# Patient Record
Sex: MALE | Race: WHITE | NOT HISPANIC OR LATINO | URBAN - METROPOLITAN AREA
[De-identification: names, ages, dates, MRNs, and addresses within clinical notes are randomized per-mention and may not be internally consistent; named-entity substitution may affect disease eponyms.]

---

## 2022-08-08 ENCOUNTER — INPATIENT (INPATIENT)
Facility: HOSPITAL | Age: 57
LOS: 7 days | Discharge: HOME | DRG: 321 | End: 2022-08-16
Attending: NEUROLOGICAL SURGERY | Admitting: NEUROLOGICAL SURGERY
Payer: OTHER MISCELLANEOUS

## 2022-08-08 VITALS
SYSTOLIC BLOOD PRESSURE: 143 MMHG | HEART RATE: 50 BPM | TEMPERATURE: 97 F | WEIGHT: 181 LBS | DIASTOLIC BLOOD PRESSURE: 92 MMHG | HEIGHT: 70 IN | OXYGEN SATURATION: 100 % | RESPIRATION RATE: 16 BRPM

## 2022-08-08 DIAGNOSIS — G97.41 ACCIDENTAL PUNCTURE OR LACERATION OF DURA DURING A PROCEDURE: ICD-10-CM
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
## 2022-08-08 DIAGNOSIS — M51.17 INTERVERTEBRAL DISC DISORDERS WITH RADICULOPATHY, LUMBOSACRAL REGION: ICD-10-CM

## 2022-08-08 DIAGNOSIS — I10 ESSENTIAL (PRIMARY) HYPERTENSION: ICD-10-CM

## 2022-08-08 DIAGNOSIS — Z79.899 OTHER LONG TERM (CURRENT) DRUG THERAPY: ICD-10-CM

## 2022-08-08 DIAGNOSIS — G44.89 OTHER HEADACHE SYNDROME: ICD-10-CM

## 2022-08-08 DIAGNOSIS — G96.09 OTHER SPINAL CEREBROSPINAL FLUID LEAK: ICD-10-CM

## 2022-08-08 DIAGNOSIS — Z53.31 LAPAROSCOPIC SURGICAL PROCEDURE CONVERTED TO OPEN PROCEDURE: ICD-10-CM

## 2022-08-08 DIAGNOSIS — M51.26 OTHER INTERVERTEBRAL DISC DISPLACEMENT, LUMBAR REGION: ICD-10-CM

## 2022-08-08 LAB
ABO RH CONFIRMATION: SIGNIFICANT CHANGE UP
GLUCOSE BLDC GLUCOMTR-MCNC: 106 MG/DL — HIGH (ref 70–99)
GLUCOSE BLDC GLUCOMTR-MCNC: 121 MG/DL — HIGH (ref 70–99)
GLUCOSE BLDC GLUCOMTR-MCNC: 158 MG/DL — HIGH (ref 70–99)

## 2022-08-08 PROCEDURE — 80053 COMPREHEN METABOLIC PANEL: CPT

## 2022-08-08 PROCEDURE — 82962 GLUCOSE BLOOD TEST: CPT

## 2022-08-08 PROCEDURE — 85027 COMPLETE CBC AUTOMATED: CPT

## 2022-08-08 PROCEDURE — C1889: CPT

## 2022-08-08 PROCEDURE — 85025 COMPLETE CBC W/AUTO DIFF WBC: CPT

## 2022-08-08 PROCEDURE — 36415 COLL VENOUS BLD VENIPUNCTURE: CPT

## 2022-08-08 PROCEDURE — 72148 MRI LUMBAR SPINE W/O DYE: CPT

## 2022-08-08 PROCEDURE — 88304 TISSUE EXAM BY PATHOLOGIST: CPT | Mod: 26

## 2022-08-08 PROCEDURE — 80048 BASIC METABOLIC PNL TOTAL CA: CPT

## 2022-08-08 PROCEDURE — 72131 CT LUMBAR SPINE W/O DYE: CPT

## 2022-08-08 RX ORDER — FENTANYL CITRATE 50 UG/ML
50 INJECTION INTRAVENOUS
Refills: 0 | Status: DISCONTINUED | OUTPATIENT
Start: 2022-08-08 | End: 2022-08-08

## 2022-08-08 RX ORDER — FAMOTIDINE 10 MG/ML
20 INJECTION INTRAVENOUS EVERY 12 HOURS
Refills: 0 | Status: DISCONTINUED | OUTPATIENT
Start: 2022-08-08 | End: 2022-08-16

## 2022-08-08 RX ORDER — ACETAMINOPHEN 500 MG
650 TABLET ORAL EVERY 6 HOURS
Refills: 0 | Status: DISCONTINUED | OUTPATIENT
Start: 2022-08-08 | End: 2022-08-16

## 2022-08-08 RX ORDER — SODIUM CHLORIDE 9 MG/ML
1000 INJECTION INTRAMUSCULAR; INTRAVENOUS; SUBCUTANEOUS
Refills: 0 | Status: DISCONTINUED | OUTPATIENT
Start: 2022-08-08 | End: 2022-08-08

## 2022-08-08 RX ORDER — FOLIC ACID 0.8 MG
1 TABLET ORAL DAILY
Refills: 0 | Status: DISCONTINUED | OUTPATIENT
Start: 2022-08-08 | End: 2022-08-16

## 2022-08-08 RX ORDER — HYDROMORPHONE HYDROCHLORIDE 2 MG/ML
0.5 INJECTION INTRAMUSCULAR; INTRAVENOUS; SUBCUTANEOUS EVERY 4 HOURS
Refills: 0 | Status: DISCONTINUED | OUTPATIENT
Start: 2022-08-08 | End: 2022-08-15

## 2022-08-08 RX ORDER — HYDROMORPHONE HYDROCHLORIDE 2 MG/ML
0.5 INJECTION INTRAMUSCULAR; INTRAVENOUS; SUBCUTANEOUS
Refills: 0 | Status: DISCONTINUED | OUTPATIENT
Start: 2022-08-08 | End: 2022-08-08

## 2022-08-08 RX ORDER — DIAZEPAM 5 MG
5 TABLET ORAL EVERY 8 HOURS
Refills: 0 | Status: DISCONTINUED | OUTPATIENT
Start: 2022-08-08 | End: 2022-08-15

## 2022-08-08 RX ORDER — SENNA PLUS 8.6 MG/1
2 TABLET ORAL AT BEDTIME
Refills: 0 | Status: DISCONTINUED | OUTPATIENT
Start: 2022-08-08 | End: 2022-08-16

## 2022-08-08 RX ORDER — ACETAMINOPHEN 500 MG
1000 TABLET ORAL ONCE
Refills: 0 | Status: COMPLETED | OUTPATIENT
Start: 2022-08-08 | End: 2022-08-08

## 2022-08-08 RX ORDER — OXYCODONE HYDROCHLORIDE 5 MG/1
5 TABLET ORAL ONCE
Refills: 0 | Status: DISCONTINUED | OUTPATIENT
Start: 2022-08-08 | End: 2022-08-08

## 2022-08-08 RX ORDER — ONDANSETRON 8 MG/1
4 TABLET, FILM COATED ORAL ONCE
Refills: 0 | Status: DISCONTINUED | OUTPATIENT
Start: 2022-08-08 | End: 2022-08-08

## 2022-08-08 RX ORDER — ONDANSETRON 8 MG/1
4 TABLET, FILM COATED ORAL EVERY 6 HOURS
Refills: 0 | Status: DISCONTINUED | OUTPATIENT
Start: 2022-08-08 | End: 2022-08-16

## 2022-08-08 RX ORDER — CEFAZOLIN SODIUM 1 G
2000 VIAL (EA) INJECTION EVERY 8 HOURS
Refills: 0 | Status: COMPLETED | OUTPATIENT
Start: 2022-08-08 | End: 2022-08-08

## 2022-08-08 RX ORDER — OXYCODONE HYDROCHLORIDE 5 MG/1
10 TABLET ORAL EVERY 6 HOURS
Refills: 0 | Status: DISCONTINUED | OUTPATIENT
Start: 2022-08-08 | End: 2022-08-13

## 2022-08-08 RX ORDER — SODIUM CHLORIDE 9 MG/ML
1000 INJECTION, SOLUTION INTRAVENOUS
Refills: 0 | Status: DISCONTINUED | OUTPATIENT
Start: 2022-08-08 | End: 2022-08-08

## 2022-08-08 RX ADMIN — OXYCODONE HYDROCHLORIDE 5 MILLIGRAM(S): 5 TABLET ORAL at 11:45

## 2022-08-08 RX ADMIN — Medication 100 MILLIGRAM(S): at 15:30

## 2022-08-08 RX ADMIN — Medication 1000 MILLIGRAM(S): at 12:15

## 2022-08-08 RX ADMIN — SODIUM CHLORIDE 125 MILLILITER(S): 9 INJECTION, SOLUTION INTRAVENOUS at 11:45

## 2022-08-08 RX ADMIN — OXYCODONE HYDROCHLORIDE 5 MILLIGRAM(S): 5 TABLET ORAL at 12:50

## 2022-08-08 RX ADMIN — Medication 400 MILLIGRAM(S): at 11:44

## 2022-08-08 RX ADMIN — SODIUM CHLORIDE 75 MILLILITER(S): 9 INJECTION INTRAMUSCULAR; INTRAVENOUS; SUBCUTANEOUS at 15:30

## 2022-08-08 RX ADMIN — Medication 100 MILLIGRAM(S): at 23:14

## 2022-08-08 RX ADMIN — OXYCODONE HYDROCHLORIDE 10 MILLIGRAM(S): 5 TABLET ORAL at 20:22

## 2022-08-08 RX ADMIN — SENNA PLUS 2 TABLET(S): 8.6 TABLET ORAL at 21:13

## 2022-08-08 RX ADMIN — OXYCODONE HYDROCHLORIDE 10 MILLIGRAM(S): 5 TABLET ORAL at 20:50

## 2022-08-08 NOTE — ASU PREOP CHECKLIST - MUPIRONCIN COMMENTS
nash used CHG sponges x2,  body BRYANNA in ASU,  BRYANNA wipe to spine x2 by RN in ASU. Povidone nasally x2 to each nostril

## 2022-08-08 NOTE — ASU PATIENT PROFILE, ADULT - FALL HARM RISK - UNIVERSAL INTERVENTIONS
Bed in lowest position, wheels locked, appropriate side rails in place/Call bell, personal items and telephone in reach/Instruct patient to call for assistance before getting out of bed or chair/Non-slip footwear when patient is out of bed/[place] to call system/Physically safe environment - no spills, clutter or unnecessary equipment/Purposeful Proactive Rounding/Room/bathroom lighting operational, light cord in reach

## 2022-08-09 LAB
ANION GAP SERPL CALC-SCNC: 7 MMOL/L — SIGNIFICANT CHANGE UP (ref 5–17)
BASOPHILS # BLD AUTO: 0.02 K/UL — SIGNIFICANT CHANGE UP (ref 0–0.2)
BASOPHILS NFR BLD AUTO: 0.2 % — SIGNIFICANT CHANGE UP (ref 0–2)
BUN SERPL-MCNC: 17 MG/DL — SIGNIFICANT CHANGE UP (ref 7–23)
CALCIUM SERPL-MCNC: 8.5 MG/DL — SIGNIFICANT CHANGE UP (ref 8.5–10.1)
CHLORIDE SERPL-SCNC: 107 MMOL/L — SIGNIFICANT CHANGE UP (ref 96–108)
CO2 SERPL-SCNC: 26 MMOL/L — SIGNIFICANT CHANGE UP (ref 22–31)
CREAT SERPL-MCNC: 1.21 MG/DL — SIGNIFICANT CHANGE UP (ref 0.5–1.3)
EGFR: 71 ML/MIN/1.73M2 — SIGNIFICANT CHANGE UP
EOSINOPHIL # BLD AUTO: 0 K/UL — SIGNIFICANT CHANGE UP (ref 0–0.5)
EOSINOPHIL NFR BLD AUTO: 0 % — SIGNIFICANT CHANGE UP (ref 0–6)
GLUCOSE SERPL-MCNC: 108 MG/DL — HIGH (ref 70–99)
HCT VFR BLD CALC: 41 % — SIGNIFICANT CHANGE UP (ref 39–50)
HGB BLD-MCNC: 14.7 G/DL — SIGNIFICANT CHANGE UP (ref 13–17)
IMM GRANULOCYTES NFR BLD AUTO: 0.3 % — SIGNIFICANT CHANGE UP (ref 0–1.5)
LYMPHOCYTES # BLD AUTO: 0.87 K/UL — LOW (ref 1–3.3)
LYMPHOCYTES # BLD AUTO: 9.4 % — LOW (ref 13–44)
MCHC RBC-ENTMCNC: 33.9 PG — SIGNIFICANT CHANGE UP (ref 27–34)
MCHC RBC-ENTMCNC: 35.9 GM/DL — SIGNIFICANT CHANGE UP (ref 32–36)
MCV RBC AUTO: 94.7 FL — SIGNIFICANT CHANGE UP (ref 80–100)
MONOCYTES # BLD AUTO: 0.79 K/UL — SIGNIFICANT CHANGE UP (ref 0–0.9)
MONOCYTES NFR BLD AUTO: 8.5 % — SIGNIFICANT CHANGE UP (ref 2–14)
NEUTROPHILS # BLD AUTO: 7.53 K/UL — HIGH (ref 1.8–7.4)
NEUTROPHILS NFR BLD AUTO: 81.6 % — HIGH (ref 43–77)
PLATELET # BLD AUTO: 169 K/UL — SIGNIFICANT CHANGE UP (ref 150–400)
POTASSIUM SERPL-MCNC: 3.7 MMOL/L — SIGNIFICANT CHANGE UP (ref 3.5–5.3)
POTASSIUM SERPL-SCNC: 3.7 MMOL/L — SIGNIFICANT CHANGE UP (ref 3.5–5.3)
RBC # BLD: 4.33 M/UL — SIGNIFICANT CHANGE UP (ref 4.2–5.8)
RBC # FLD: 12.2 % — SIGNIFICANT CHANGE UP (ref 10.3–14.5)
SODIUM SERPL-SCNC: 140 MMOL/L — SIGNIFICANT CHANGE UP (ref 135–145)
WBC # BLD: 9.24 K/UL — SIGNIFICANT CHANGE UP (ref 3.8–10.5)
WBC # FLD AUTO: 9.24 K/UL — SIGNIFICANT CHANGE UP (ref 3.8–10.5)

## 2022-08-09 PROCEDURE — 99024 POSTOP FOLLOW-UP VISIT: CPT

## 2022-08-09 RX ORDER — METOPROLOL TARTRATE 50 MG
1 TABLET ORAL
Qty: 0 | Refills: 0 | DISCHARGE

## 2022-08-09 RX ORDER — ACETAMINOPHEN 500 MG
1000 TABLET ORAL ONCE
Refills: 0 | Status: COMPLETED | OUTPATIENT
Start: 2022-08-09 | End: 2022-08-09

## 2022-08-09 RX ORDER — LISINOPRIL 2.5 MG/1
1 TABLET ORAL
Qty: 0 | Refills: 0 | DISCHARGE

## 2022-08-09 RX ADMIN — HYDROMORPHONE HYDROCHLORIDE 0.5 MILLIGRAM(S): 2 INJECTION INTRAMUSCULAR; INTRAVENOUS; SUBCUTANEOUS at 07:22

## 2022-08-09 RX ADMIN — Medication 650 MILLIGRAM(S): at 19:54

## 2022-08-09 RX ADMIN — Medication 1 TABLET(S): at 08:50

## 2022-08-09 RX ADMIN — Medication 650 MILLIGRAM(S): at 21:03

## 2022-08-09 RX ADMIN — Medication 1 MILLIGRAM(S): at 08:50

## 2022-08-09 RX ADMIN — OXYCODONE HYDROCHLORIDE 10 MILLIGRAM(S): 5 TABLET ORAL at 09:20

## 2022-08-09 RX ADMIN — Medication 400 MILLIGRAM(S): at 16:15

## 2022-08-09 RX ADMIN — OXYCODONE HYDROCHLORIDE 10 MILLIGRAM(S): 5 TABLET ORAL at 08:50

## 2022-08-09 RX ADMIN — HYDROMORPHONE HYDROCHLORIDE 0.5 MILLIGRAM(S): 2 INJECTION INTRAMUSCULAR; INTRAVENOUS; SUBCUTANEOUS at 06:14

## 2022-08-09 NOTE — PROGRESS NOTE ADULT - ASSESSMENT
56 yo male POD#1 L5/S1 microdiscectomy and repair of CSF leak.   - cont pain meds, advance diet as tolerated   - cont hob flat    seq teds for dvt ppx   - encourage incentive blas   - will d/w Dr. Neff

## 2022-08-09 NOTE — PATIENT PROFILE ADULT - FUNCTIONAL ASSESSMENT - DAILY ACTIVITY 3.
----- Message from [place]incito Lagos sent at 12/30/2019  4:44 PM CST -----  Contact: Patient  Type: Needs Medical Advice    Who Called:  Patient  Best Call Back Number: [phone]  Additional Information: Patient would like to discuss receiving a release. Please call to advise. Thanks!  
LM for pt to return call   
2 = A lot of assistance

## 2022-08-09 NOTE — PATIENT PROFILE ADULT - FALL HARM RISK - HARM RISK INTERVENTIONS

## 2022-08-10 RX ORDER — METHOCARBAMOL 500 MG/1
1 TABLET, FILM COATED ORAL
Qty: 21 | Refills: 0
Start: 2022-08-10 | End: 2022-08-16

## 2022-08-10 RX ORDER — FOLIC ACID 0.8 MG
1 TABLET ORAL DAILY
Refills: 0 | Status: DISCONTINUED | OUTPATIENT
Start: 2022-08-10 | End: 2022-08-16

## 2022-08-10 RX ORDER — FOLIC ACID 0.8 MG
1 TABLET ORAL
Qty: 0 | Refills: 0 | DISCHARGE
Start: 2022-08-10

## 2022-08-10 RX ORDER — METOPROLOL TARTRATE 50 MG
50 TABLET ORAL
Refills: 0 | Status: DISCONTINUED | OUTPATIENT
Start: 2022-08-10 | End: 2022-08-16

## 2022-08-10 RX ORDER — LISINOPRIL 2.5 MG/1
20 TABLET ORAL DAILY
Refills: 0 | Status: DISCONTINUED | OUTPATIENT
Start: 2022-08-10 | End: 2022-08-16

## 2022-08-10 RX ORDER — DEXAMETHASONE 0.5 MG/5ML
2 ELIXIR ORAL EVERY 8 HOURS
Refills: 0 | Status: COMPLETED | OUTPATIENT
Start: 2022-08-10 | End: 2022-08-12

## 2022-08-10 RX ADMIN — LISINOPRIL 20 MILLIGRAM(S): 2.5 TABLET ORAL at 17:18

## 2022-08-10 RX ADMIN — OXYCODONE HYDROCHLORIDE 10 MILLIGRAM(S): 5 TABLET ORAL at 01:20

## 2022-08-10 RX ADMIN — Medication 5 MILLIGRAM(S): at 14:20

## 2022-08-10 RX ADMIN — OXYCODONE HYDROCHLORIDE 10 MILLIGRAM(S): 5 TABLET ORAL at 00:31

## 2022-08-10 RX ADMIN — Medication 2 MILLIGRAM(S): at 21:46

## 2022-08-10 RX ADMIN — Medication 1 TABLET(S): at 09:16

## 2022-08-10 RX ADMIN — Medication 50 MILLIGRAM(S): at 21:45

## 2022-08-10 RX ADMIN — SENNA PLUS 2 TABLET(S): 8.6 TABLET ORAL at 21:45

## 2022-08-10 RX ADMIN — Medication 1 MILLIGRAM(S): at 09:16

## 2022-08-10 NOTE — PROGRESS NOTE ADULT - ASSESSMENT
58 yo male POD#2 L5/S1 microdiscectomy and repair of CSF leak.   - cont pain meds, advance diet as tolerated   - slowly elevate HOB  - seq teds for dvt ppx   - encourage incentive blas   - if continued improvement possible d/c later today  - will d/w Dr. Neff

## 2022-08-10 NOTE — DISCHARGE NOTE PROVIDER - CARE PROVIDER_API CALL
Moshe Camejo)  Neurosurgery  [address] [place] [address]  Phone: [phone]  Fax: [phone]  Follow Up Time: 2 weeks

## 2022-08-10 NOTE — DISCHARGE NOTE PROVIDER - HOSPITAL COURSE
HPI: 56 yo male POD#1 L5/S1 microdiscectomy and repair of CSF leak.   Pt flat overnight, pt doing well today c/o incisional pain, pt denies any radicular pain, numbness, tingling or weakness, Pt denies any CP, SOB. Pt voiding well     8/10: POD#2 Patient noted to have headache overnight, improved when HOB flat. Patient currently with no headaches   · Subjective and Objective:   Patient is a 57y old  Male who presents with a chief complaint of L5-S1 microdiscectomy (14 Aug 2022 15:12)      HPI:  58 yo male s/p L5/S1 microdiscectomy and repair of CSF leak.     8/10 - POD#2 Patient noted to have headache overnight, improved when HOB flat. Patient currently with no headaches    8/11 - POD #3, pt seen and examined, no headache currently but every time he sits up more than 20 minutes he gets a headache which resolves when he lays flat.   Incision site is clean and dry, no fluid leaking from incision, no bulging below the skin. Patient is afebrile, tolerating PO, voiding without difficulty.     8/12 - POD#4 felt well in am without headache, but c/o of headache again after being up for a while    8/13 - POD#5 c/o incisional "tightness/burning pain"    8/14 - POD#6 lying in bed, denies headache, when OOB c/o dizziness & headache    8/15 - POD#7 Pt seen and examined earlier today, in NAD. Appears comfortable, admits to HA when OOB / ambulating but states it has been getting better. Not sure about going home since he lives alone and is afraid he will not be able to manage    8/16- POD#6 Headaches improved today

## 2022-08-10 NOTE — DISCHARGE NOTE PROVIDER - NSDCMRMEDTOKEN_GEN_ALL_CORE_FT
folic acid 1 mg oral tablet: 1 tab(s) orally once a day  lisinopril 20 mg oral tablet: 1 tab(s) orally once a day  methocarbamol 750 mg oral tablet: 1 tab(s) orally 3 times a day   metoprolol tartrate 50 mg oral tablet: 1 tab(s) orally 2 times a day  oxycodone-acetaminophen 5 mg-325 mg oral tablet: 1 tab(s) orally 4 times a day, As Needed MDD:4 tablets    folic acid 1 mg oral tablet: 1 tab(s) orally once a day  lisinopril 20 mg oral tablet: 1 tab(s) orally once a day  metoprolol tartrate 50 mg oral tablet: 1 tab(s) orally 2 times a day  oxycodone-acetaminophen 5 mg-325 mg oral tablet: 1 tab(s) orally 4 times a day, As Needed MDD:4 tablets

## 2022-08-10 NOTE — PROGRESS NOTE ADULT - SUBJECTIVE AND OBJECTIVE BOX
HPI: 58 yo male POD#1 L5/S1 microdiscectomy and repair of CSF leak.   Pt flat overnight, pt doing well today c/o incisional pain, pt denies any radicular pain, numbness, tingling or weakness, Pt denies any CP, SOB. Pt voiding well     8/10: POD#2 Patient noted to have headache overnight, improved when HOB flat. Patietn currently with no headaches    Vital Signs Last 24 Hrs  T(C): 36.9 (10 Aug 2022 09:00), Max: 36.9 (10 Aug 2022 09:00)  T(F): 98.4 (10 Aug 2022 09:00), Max: 98.4 (10 Aug 2022 09:00)  HR: 68 (10 Aug 2022 09:00) (62 - 69)  BP: 130/79 (10 Aug 2022 09:00) (122/77 - 132/85)  BP(mean): --  RR: 16 (10 Aug 2022 09:00) (16 - 16)  SpO2: 97% (10 Aug 2022 09:00) (97% - 98%)    Parameters below as of 10 Aug 2022 09:00  Patient On (Oxygen Delivery Method): room air        MEDICATIONS  (STANDING):  folic acid 1 milliGRAM(s) Oral daily  multivitamin 1 Tablet(s) Oral daily  senna 2 Tablet(s) Oral at bedtime    MEDICATIONS  (PRN):  acetaminophen     Tablet .. 650 milliGRAM(s) Oral every 6 hours PRN Temp greater or equal to 38C (100.4F), Mild Pain (1 - 3)  bisacodyl 5 milliGRAM(s) Oral every 12 hours PRN Constipation  diazepam    Tablet 5 milliGRAM(s) Oral every 8 hours PRN muscle spasm  famotidine    Tablet 20 milliGRAM(s) Oral every 12 hours PRN Dyspepsia  HYDROmorphone  Injectable 0.5 milliGRAM(s) IV Push every 4 hours PRN for breakthrough pain  ondansetron Injectable 4 milliGRAM(s) IV Push every 6 hours PRN Nausea and/or Vomiting  oxyCODONE    IR 10 milliGRAM(s) Oral every 6 hours PRN Moderate Pain (4 - 6)      ROS: pertinent positives in HPI, all other ROS were reviewed and are negative     PHYSICAL EXAM:      Constitutional: awake and alert.  Constitutional: awake and alert.  HEENT: PERRLA, EOMI,   Neck: Supple.  Respiratory: Breath sounds are clear bilaterally  Cardiovascular: S1 and S2, regular rhythm  Gastrointestinal: soft, nontender  Extremities:  no edema  Musculoskeletal: no joint swelling/tenderness, no abnormal movements  Skin: No rashes, lumbar dressing intact clean and dry    Neurological exam:  HF: A x O x 3. Appropriately interactive, normal affect. Speech fluent, No Aphasia or paraphasic errors. Naming /repetition intact   CN: TENISHA, EOMI, VFF, facial sensation normal, no NLFD, tongue midline  Motor: No pronator drift, Strength 5/5 in all 4 ext, normal bulk and tone, no tremor, rigidity or bradykinesia.    Sens: Intact to light touch   Gait/Balance: Cannot test          LABS:                         14.7   9.24  )-----------( 169      ( 09 Aug 2022 08:23 )             41.0     08-09    140  |  107  |  17  ----------------------------<  108<H>  3.7   |  26  |  1.21    Ca    8.5      09 Aug 2022 08:23              RADIOLOGY:     HPI: 56 yo male POD#1 L5/S1 microdiscectomy and repair of CSF leak.   Pt flat overnight, pt doing well today c/o incisional pain, pt denies any radicular pain, numbness, tingling or weakness, Pt denies any CP, SOB. Pt voiding well     8/10: POD#2 Patient noted to have headache overnight, improved when HOB flat. Patietn currently with no headaches    Vital Signs Last 24 Hrs  T(C): 36.9 (10 Aug 2022 09:00), Max: 36.9 (10 Aug 2022 09:00)  T(F): 98.4 (10 Aug 2022 09:00), Max: 98.4 (10 Aug 2022 09:00)  HR: 68 (10 Aug 2022 09:00) (62 - 69)  BP: 130/79 (10 Aug 2022 09:00) (122/77 - 132/85)  BP(mean): --  RR: 16 (10 Aug 2022 09:00) (16 - 16)  SpO2: 97% (10 Aug 2022 09:00) (97% - 98%)    Parameters below as of 10 Aug 2022 09:00  Patient On (Oxygen Delivery Method): room air        MEDICATIONS  (STANDING):  folic acid 1 milliGRAM(s) Oral daily  multivitamin 1 Tablet(s) Oral daily  senna 2 Tablet(s) Oral at bedtime    MEDICATIONS  (PRN):  acetaminophen     Tablet .. 650 milliGRAM(s) Oral every 6 hours PRN Temp greater or equal to 38C (100.4F), Mild Pain (1 - 3)  bisacodyl 5 milliGRAM(s) Oral every 12 hours PRN Constipation  diazepam    Tablet 5 milliGRAM(s) Oral every 8 hours PRN muscle spasm  famotidine    Tablet 20 milliGRAM(s) Oral every 12 hours PRN Dyspepsia  HYDROmorphone  Injectable 0.5 milliGRAM(s) IV Push every 4 hours PRN for breakthrough pain  ondansetron Injectable 4 milliGRAM(s) IV Push every 6 hours PRN Nausea and/or Vomiting  oxyCODONE    IR 10 milliGRAM(s) Oral every 6 hours PRN Moderate Pain (4 - 6)      ROS: pertinent positives in HPI, all other ROS were reviewed and are negative     PHYSICAL EXAM:      Constitutional: awake and alert.  Constitutional: awake and alert.  HEENT: PERRLA, EOMI,   Neck: Supple.  Respiratory: Breath sounds are clear bilaterally  Cardiovascular: S1 and S2, regular rhythm  Gastrointestinal: soft, nontender  Extremities:  no edema  Musculoskeletal: no joint swelling/tenderness, no abnormal movements  Skin: No rashes, lumbar dressing intact clean and dry, removed during examination with dermabond    Neurological exam:  HF: A x O x 3. Appropriately interactive, normal affect. Speech fluent, No Aphasia or paraphasic errors. Naming /repetition intact   CN: TENISHA, EOMI, VFF, facial sensation normal, no NLFD, tongue midline  Motor: No pronator drift, Strength 5/5 in all 4 ext, normal bulk and tone, no tremor, rigidity or bradykinesia.    Sens: Intact to light touch   Gait/Balance: Cannot test          LABS:                         14.7   9.24  )-----------( 169      ( 09 Aug 2022 08:23 )             41.0     08-09    140  |  107  |  17  ----------------------------<  108<H>  3.7   |  26  |  1.21    Ca    8.5      09 Aug 2022 08:23              RADIOLOGY:

## 2022-08-11 LAB — SURGICAL PATHOLOGY STUDY: SIGNIFICANT CHANGE UP

## 2022-08-11 PROCEDURE — 72131 CT LUMBAR SPINE W/O DYE: CPT | Mod: 26

## 2022-08-11 RX ORDER — HEPARIN SODIUM 5000 [USP'U]/ML
5000 INJECTION INTRAVENOUS; SUBCUTANEOUS EVERY 8 HOURS
Refills: 0 | Status: DISCONTINUED | OUTPATIENT
Start: 2022-08-11 | End: 2022-08-16

## 2022-08-11 RX ADMIN — Medication 1 MILLIGRAM(S): at 09:56

## 2022-08-11 RX ADMIN — Medication 2 MILLIGRAM(S): at 21:29

## 2022-08-11 RX ADMIN — Medication 50 MILLIGRAM(S): at 09:56

## 2022-08-11 RX ADMIN — Medication 1 TABLET(S): at 09:56

## 2022-08-11 RX ADMIN — LISINOPRIL 20 MILLIGRAM(S): 2.5 TABLET ORAL at 09:56

## 2022-08-11 RX ADMIN — HEPARIN SODIUM 5000 UNIT(S): 5000 INJECTION INTRAVENOUS; SUBCUTANEOUS at 21:29

## 2022-08-11 RX ADMIN — SENNA PLUS 2 TABLET(S): 8.6 TABLET ORAL at 21:29

## 2022-08-11 RX ADMIN — Medication 2 MILLIGRAM(S): at 05:23

## 2022-08-11 RX ADMIN — Medication 5 MILLIGRAM(S): at 18:19

## 2022-08-11 RX ADMIN — Medication 2 MILLIGRAM(S): at 15:18

## 2022-08-11 RX ADMIN — Medication 50 MILLIGRAM(S): at 21:29

## 2022-08-11 NOTE — PROGRESS NOTE ADULT - SUBJECTIVE AND OBJECTIVE BOX
HPI: 58 yo male POD#1 L5/S1 microdiscectomy and repair of CSF leak.   Pt flat overnight, pt doing well today c/o incisional pain, pt denies any radicular pain, numbness, tingling or weakness, Pt denies any CP, SOB. Pt voiding well     8/10: POD#2 Patient noted to have headache overnight, improved when HOB flat. Patient currently with no headaches    8/11- POD #3, pt seen and examined, no headache currently but every time he sits up more than 20 minutes he gets a headache which resolves when he lays flat.   Incision site is clean and dry, no fluid leaking from incision, no bulging below the skin. Patient is afebrile, tolerating PO, voiding without difficulty.     Vital Signs Last 24 Hrs  T(C): 37.1 (11 Aug 2022 08:30), Max: 37.1 (11 Aug 2022 08:30)  T(F): 98.7 (11 Aug 2022 08:30), Max: 98.7 (11 Aug 2022 08:30)  HR: 89 (11 Aug 2022 08:30) (64 - 89)  BP: 132/88 (11 Aug 2022 08:30) (126/87 - 145/81)  BP(mean): 97 (10 Aug 2022 20:21) (97 - 97)  RR: 16 (11 Aug 2022 08:30) (16 - 17)  SpO2: 98% (11 Aug 2022 08:30) (97% - 100%)    Parameters below as of 11 Aug 2022 08:30  Patient On (Oxygen Delivery Method): room air    MEDICATIONS  (STANDING):  dexAMETHasone     Tablet 2 milliGRAM(s) Oral every 8 hours  folic acid 1 milliGRAM(s) Oral daily  lisinopril 20 milliGRAM(s) Oral daily  metoprolol tartrate 50 milliGRAM(s) Oral two times a day  multivitamin 1 Tablet(s) Oral daily  senna 2 Tablet(s) Oral at bedtime    MEDICATIONS  (PRN):  acetaminophen     Tablet .. 650 milliGRAM(s) Oral every 6 hours PRN Temp greater or equal to 38C (100.4F), Mild Pain (1 - 3)  bisacodyl 5 milliGRAM(s) Oral every 12 hours PRN Constipation  diazepam    Tablet 5 milliGRAM(s) Oral every 8 hours PRN muscle spasm  famotidine    Tablet 20 milliGRAM(s) Oral every 12 hours PRN Dyspepsia  HYDROmorphone  Injectable 0.5 milliGRAM(s) IV Push every 4 hours PRN for breakthrough pain  ondansetron Injectable 4 milliGRAM(s) IV Push every 6 hours PRN Nausea and/or Vomiting  oxyCODONE    IR 10 milliGRAM(s) Oral every 6 hours PRN Moderate Pain (4 - 6)    ROS: pertinent positives in HPI, all other ROS were reviewed and are negative     PHYSICAL EXAM:  Constitutional: awake and alert  HEENT: PERRLA, EOMI  Neck: Supple  Respiratory: Breath sounds are clear bilaterally  Cardiovascular: S1 and S2, regular rhythm  Gastrointestinal: soft, nontender  Extremities:  no edema  Vascular: Carotid Bruit - no  Musculoskeletal: no joint swelling/tenderness, no abnormal movements  Skin: incision clean and dry     Neurological exam:  HF: A x O x 3. Appropriately interactive, normal affect. Speech fluent, No Aphasia or paraphasic errors. Naming /repetition intact   CN: ALBERT, EOMI, VFF, facial sensation normal, no NLFD, tongue midline, Palate moves equally, SCM equal bilaterally  Motor: No pronator drift, Strength 5/5 in all 4 ext, normal bulk and tone, no tremor, rigidity or bradykinesia.    Sens: Intact to light touch / PP/ VS/ JS    Reflexes: Symmetric and normal, downgoing toes b/l  Coord:  No FNFA, dysmetria, CARLIE intact   Gait/Balance: Not tested     RADIOLOGY:       HPI: 56 yo male POD#1 L5/S1 microdiscectomy and repair of CSF leak.   Pt flat overnight, pt doing well today c/o incisional pain, pt denies any radicular pain, numbness, tingling or weakness, Pt denies any CP, SOB. Pt voiding well     8/10: POD#2 Patient noted to have headache overnight, improved when HOB flat. Patient currently with no headaches    8/11- POD #3, pt seen and examined, no headache currently but every time he sits up more than 20 minutes he gets a headache which resolves when he lays flat.   Incision site is clean and dry, no fluid leaking from incision, no bulging below the skin. Patient is afebrile, tolerating PO, voiding without difficulty.     Vital Signs Last 24 Hrs  T(C): 37.1 (11 Aug 2022 08:30), Max: 37.1 (11 Aug 2022 08:30)  T(F): 98.7 (11 Aug 2022 08:30), Max: 98.7 (11 Aug 2022 08:30)  HR: 89 (11 Aug 2022 08:30) (64 - 89)  BP: 132/88 (11 Aug 2022 08:30) (126/87 - 145/81)  BP(mean): 97 (10 Aug 2022 20:21) (97 - 97)  RR: 16 (11 Aug 2022 08:30) (16 - 17)  SpO2: 98% (11 Aug 2022 08:30) (97% - 100%)    Parameters below as of 11 Aug 2022 08:30  Patient On (Oxygen Delivery Method): room air    MEDICATIONS  (STANDING):  dexAMETHasone     Tablet 2 milliGRAM(s) Oral every 8 hours  folic acid 1 milliGRAM(s) Oral daily  lisinopril 20 milliGRAM(s) Oral daily  metoprolol tartrate 50 milliGRAM(s) Oral two times a day  multivitamin 1 Tablet(s) Oral daily  senna 2 Tablet(s) Oral at bedtime    MEDICATIONS  (PRN):  acetaminophen     Tablet .. 650 milliGRAM(s) Oral every 6 hours PRN Temp greater or equal to 38C (100.4F), Mild Pain (1 - 3)  bisacodyl 5 milliGRAM(s) Oral every 12 hours PRN Constipation  diazepam    Tablet 5 milliGRAM(s) Oral every 8 hours PRN muscle spasm  famotidine    Tablet 20 milliGRAM(s) Oral every 12 hours PRN Dyspepsia  HYDROmorphone  Injectable 0.5 milliGRAM(s) IV Push every 4 hours PRN for breakthrough pain  ondansetron Injectable 4 milliGRAM(s) IV Push every 6 hours PRN Nausea and/or Vomiting  oxyCODONE    IR 10 milliGRAM(s) Oral every 6 hours PRN Moderate Pain (4 - 6)    ROS: pertinent positives in HPI, all other ROS were reviewed and are negative     PHYSICAL EXAM:  Constitutional: awake and alert  HEENT: PERRLA, EOMI  Neck: Supple  Respiratory: Breath sounds are clear bilaterally  Cardiovascular: S1 and S2, regular rhythm  Gastrointestinal: soft, nontender  Extremities:  no edema  Vascular: Carotid Bruit - no  Musculoskeletal: no joint swelling/tenderness, no abnormal movements  Skin: incision clean and dry     Neurological exam:  HF: A x O x 3. Appropriately interactive, normal affect. Speech fluent, No Aphasia or paraphasic errors. Naming /repetition intact   CN: ALBERT, EOMI, VFF, facial sensation normal, no NLFD, tongue midline, Palate moves equally, SCM equal bilaterally  Motor: No pronator drift, Strength 5/5 in all 4 ext, normal bulk and tone, no tremor, rigidity or bradykinesia.    Sens: Intact to light touch / PP/ VS/ JS    Reflexes: Symmetric and normal, downgoing toes b/l  Coord:  No FNFA, dysmetria, CARLIE intact   Gait/Balance: Not tested     RADIOLOGY:  < from: CT Lumbar Spine No Cont (08.11.22 @ 09:33) >  IMPRESSION:  Mild to moderate degenerative disc disease and spondylosis   at L1-2 through L5-S1 with bulges at L2-3 through L5-S1 which flatten the   ventral thecal sac and narrow the BILATERAL neural foramina. Small LEFT   foraminal disc herniation noted at L2-3 which narrows the LEFT neural   foramen. Mild central stenosis at L4-5 on a degenerative basis. Moderate   central to LEFT paracentral disc herniation at L5-S1 which compresses the   ventral thecal sac. 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 Tiny foci of air are seen within the spinal canal   due to recent microdiscectomy. Postsurgical changes noted along the LEFT   partial laminectomy surgical tract. No large fluid collection is noted.     No vertebral fracture is recognized.

## 2022-08-11 NOTE — PROGRESS NOTE ADULT - ASSESSMENT
56 yo male POD#3 L5/S1 microdiscectomy and repair of CSF leak.     Pt with continued postural headaches  Will continue flat bedrest today and try to increase head of bed again tomorrow  Follow up CT lumbar spine  Pain meds as needed  Will start SQ heparin for DVT PPX    58 yo male POD#3 L5/S1 microdiscectomy and repair of CSF leak.     Pt with continued postural headaches  Will continue flat bedrest today and try to increase head of bed again tomorrow  CT lumbar spine shows no large fluid collection   Pain meds as needed  Will start SQ heparin for DVT PPX

## 2022-08-12 RX ADMIN — OXYCODONE HYDROCHLORIDE 10 MILLIGRAM(S): 5 TABLET ORAL at 05:18

## 2022-08-12 RX ADMIN — HEPARIN SODIUM 5000 UNIT(S): 5000 INJECTION INTRAVENOUS; SUBCUTANEOUS at 21:32

## 2022-08-12 RX ADMIN — HEPARIN SODIUM 5000 UNIT(S): 5000 INJECTION INTRAVENOUS; SUBCUTANEOUS at 13:22

## 2022-08-12 RX ADMIN — Medication 2 MILLIGRAM(S): at 05:19

## 2022-08-12 RX ADMIN — LISINOPRIL 20 MILLIGRAM(S): 2.5 TABLET ORAL at 09:40

## 2022-08-12 RX ADMIN — OXYCODONE HYDROCHLORIDE 10 MILLIGRAM(S): 5 TABLET ORAL at 20:03

## 2022-08-12 RX ADMIN — Medication 2 MILLIGRAM(S): at 13:22

## 2022-08-12 RX ADMIN — Medication 1 TABLET(S): at 09:40

## 2022-08-12 RX ADMIN — HEPARIN SODIUM 5000 UNIT(S): 5000 INJECTION INTRAVENOUS; SUBCUTANEOUS at 05:19

## 2022-08-12 RX ADMIN — Medication 50 MILLIGRAM(S): at 21:32

## 2022-08-12 RX ADMIN — Medication 50 MILLIGRAM(S): at 09:39

## 2022-08-12 RX ADMIN — OXYCODONE HYDROCHLORIDE 10 MILLIGRAM(S): 5 TABLET ORAL at 06:29

## 2022-08-12 RX ADMIN — Medication 1 MILLIGRAM(S): at 09:39

## 2022-08-12 RX ADMIN — SENNA PLUS 2 TABLET(S): 8.6 TABLET ORAL at 21:32

## 2022-08-12 RX ADMIN — OXYCODONE HYDROCHLORIDE 10 MILLIGRAM(S): 5 TABLET ORAL at 20:45

## 2022-08-12 NOTE — PROGRESS NOTE ADULT - SUBJECTIVE AND OBJECTIVE BOX
Progress Note:   · Provider Specialty  Neurosurgery    · This patient has the following condition(s)/diagnoses on this admission:  None      Reason for Admission:   Reason for Admission:  · Reason for Admission  microdiscectomy        · Subjective and Objective:     HPI: 58 yo male POD#4 L5/S1 microdiscectomy and repair of CSF leak.     8/10: POD#2 Patient noted to have headache overnight, improved when HOB flat. Patient currently with no headaches    8/11- POD #3, pt seen and examined, no headache currently but every time he sits up more than 20 minutes he gets a headache which resolves when he lays flat.   Incision site is clean and dry, no fluid leaking from incision, no bulging below the skin. Patient is afebrile, tolerating PO, voiding without difficulty.     8/12 POD#4 felt well in am without headache, but c/o of headache again after being up for a while    ICU Vital Signs Last 24 Hrs  T(C): 36.7 (12 Aug 2022 08:49), Max: 37.1 (11 Aug 2022 20:00)  T(F): 98 (12 Aug 2022 08:49), Max: 98.8 (11 Aug 2022 20:00)  HR: 75 (12 Aug 2022 08:49) (63 - 75)  BP: 116/76 (12 Aug 2022 08:49) (116/76 - 131/90)  BP(mean): 100 (11 Aug 2022 20:00) (100 - 100)  ABP: --  ABP(mean): --  RR: 16 (12 Aug 2022 08:49) (16 - 17)  SpO2: 98% (12 Aug 2022 08:49) (98% - 98%)    O2 Parameters below as of 12 Aug 2022 08:49  Patient On (Oxygen Delivery Method): room air    MEDICATIONS  (STANDING):  folic acid 1 milliGRAM(s) Oral daily  folic acid 1 milliGRAM(s) Oral daily  heparin   Injectable 5000 Unit(s) SubCutaneous every 8 hours  lisinopril 20 milliGRAM(s) Oral daily  metoprolol tartrate 50 milliGRAM(s) Oral two times a day  multivitamin 1 Tablet(s) Oral daily  senna 2 Tablet(s) Oral at bedtime    MEDICATIONS  (PRN):  acetaminophen     Tablet .. 650 milliGRAM(s) Oral every 6 hours PRN Temp greater or equal to 38C (100.4F), Mild Pain (1 - 3)  bisacodyl 5 milliGRAM(s) Oral every 12 hours PRN Constipation  diazepam    Tablet 5 milliGRAM(s) Oral every 8 hours PRN muscle spasm  famotidine    Tablet 20 milliGRAM(s) Oral every 12 hours PRN Dyspepsia  HYDROmorphone  Injectable 0.5 milliGRAM(s) IV Push every 4 hours PRN for breakthrough pain  ondansetron Injectable 4 milliGRAM(s) IV Push every 6 hours PRN Nausea and/or Vomiting  oxyCODONE    IR 10 milliGRAM(s) Oral every 6 hours PRN Moderate Pain (4 - 6)      ROS: pertinent positives in HPI, all other ROS were reviewed and are negative     PHYSICAL EXAM:  Constitutional: awake and alert  HEENT: PERRL, EOMI  Neck: Supple  Respiratory: Breath sounds are clear bilaterally  Cardiovascular: S1 S2, regular rhythm  Gastrointestinal: soft, nontender  Extremities:  no edema  Musculoskeletal: no joint swelling/tenderness, no abnormal movements  Skin: incision approximated with sutures without drainage or signs of infetion    Neurological exam:  Awake alert, AOx3 Follows commands  Face symmetrical, speech clear & fluent   Motor: Strength 5/5 in all 4 extremities  Sensation: Intact to light touch   Gait/Balance: Not tested     RADIOLOGY:  < from: CT Lumbar Spine No Cont (08.11.22 @ 09:33) >  IMPRESSION:  Mild to moderate degenerative disc disease and spondylosis   at L1-2 through L5-S1 with bulges at L2-3 through L5-S1 which flatten the   ventral thecal sac and narrow the BILATERAL neural foramina. Small LEFT   foraminal disc herniation noted at L2-3 which narrows the LEFT neural   foramen. Mild central stenosis at L4-5 on a degenerative basis. Moderate   central to LEFT paracentral disc herniation at L5-S1 which compresses the   ventral thecal sac. 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 Tiny foci of air are seen within the spinal canal   due to recent microdiscectomy. Postsurgical changes noted along the LEFT   partial laminectomy surgical tract. No large fluid collection is noted.     No vertebral fracture is recognized.            Assessment and Plan:   · Assessment      58 yo male POD#3 L5/S1 microdiscectomy and repair of CSF leak.   Pt with continued postural headaches, was feeling well this am, headaches after being up in the afternoon  Will continue flat bedrest today and try to increase head of bed again tomorrow  CT lumbar spine shows no large fluid collection   Pain meds as needed  Will start SQ heparin for DVT PPX   d/w Dr Neff           Progress Note:   · Provider Specialty	Neurosurgery  · This patient has the following condition(s)/diagnoses on this admission:	None    Reason for Admission:   Reason for Admission:  · Reason for Admission	microdiscectomy      · Subjective and Objective:   HPI: 58 yo male POD#4 L5/S1 microdiscectomy and repair of CSF leak.     8/10: POD#2 Patient noted to have headache overnight, improved when HOB flat. Patient currently with no headaches    8/11- POD #3, pt seen and examined, no headache currently but every time he sits up more than 20 minutes he gets a headache which resolves when he lays flat.   Incision site is clean and dry, no fluid leaking from incision, no bulging below the skin. Patient is afebrile, tolerating PO, voiding without difficulty.     8/12 POD#4 felt well in am without headache, but c/o of headache again after being up for a while    ICU Vital Signs Last 24 Hrs  T(C): 36.7 (12 Aug 2022 08:49), Max: 37.1 (11 Aug 2022 20:00)  T(F): 98 (12 Aug 2022 08:49), Max: 98.8 (11 Aug 2022 20:00)  HR: 75 (12 Aug 2022 08:49) (63 - 75)  BP: 116/76 (12 Aug 2022 08:49) (116/76 - 131/90)  BP(mean): 100 (11 Aug 2022 20:00) (100 - 100)  ABP: --  ABP(mean): --  RR: 16 (12 Aug 2022 08:49) (16 - 17)  SpO2: 98% (12 Aug 2022 08:49) (98% - 98%)    O2 Parameters below as of 12 Aug 2022 08:49  Patient On (Oxygen Delivery Method): room air    MEDICATIONS  (STANDING):  folic acid 1 milliGRAM(s) Oral daily  folic acid 1 milliGRAM(s) Oral daily  heparin   Injectable 5000 Unit(s) SubCutaneous every 8 hours  lisinopril 20 milliGRAM(s) Oral daily  metoprolol tartrate 50 milliGRAM(s) Oral two times a day  multivitamin 1 Tablet(s) Oral daily  senna 2 Tablet(s) Oral at bedtime    MEDICATIONS  (PRN):  acetaminophen     Tablet .. 650 milliGRAM(s) Oral every 6 hours PRN Temp greater or equal to 38C (100.4F), Mild Pain (1 - 3)  bisacodyl 5 milliGRAM(s) Oral every 12 hours PRN Constipation  diazepam    Tablet 5 milliGRAM(s) Oral every 8 hours PRN muscle spasm  famotidine    Tablet 20 milliGRAM(s) Oral every 12 hours PRN Dyspepsia  HYDROmorphone  Injectable 0.5 milliGRAM(s) IV Push every 4 hours PRN for breakthrough pain  ondansetron Injectable 4 milliGRAM(s) IV Push every 6 hours PRN Nausea and/or Vomiting  oxyCODONE    IR 10 milliGRAM(s) Oral every 6 hours PRN Moderate Pain (4 - 6)      ROS: pertinent positives in HPI, all other ROS were reviewed and are negative     PHYSICAL EXAM:  Constitutional: awake and alert  HEENT: PERRL, EOMI  Neck: Supple  Respiratory: Breath sounds are clear bilaterally  Cardiovascular: S1 S2, regular rhythm  Gastrointestinal: soft, nontender  Extremities:  no edema  Musculoskeletal: no joint swelling/tenderness, no abnormal movements  Skin: incision approximated with sutures without drainage or signs of infection    Neurological exam:  Awake alert, AOx3 Follows commands  Face symmetrical, speech clear & fluent   Motor: Strength 5/5 in all 4 extremities  Sensation: Intact to light touch   Gait/Balance: Not tested     RADIOLOGY:  < from: CT Lumbar Spine No Cont (08.11.22 @ 09:33) >  IMPRESSION:  Mild to moderate degenerative disc disease and spondylosis   at L1-2 through L5-S1 with bulges at L2-3 through L5-S1 which flatten the   ventral thecal sac and narrow the BILATERAL neural foramina. Small LEFT   foraminal disc herniation noted at L2-3 which narrows the LEFT neural   foramen. Mild central stenosis at L4-5 on a degenerative basis. Moderate   central to LEFT paracentral disc herniation at L5-S1 which compresses the   ventral thecal sac. 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 Tiny foci of air are seen within the spinal canal   due to recent microdiscectomy. Postsurgical changes noted along the LEFT   partial laminectomy surgical tract. No large fluid collection is noted.     No vertebral fracture is recognized.            Assessment and Plan:   · Assessment	  58 yo male POD#4 L5-S1 microdiscectomy and repair of CSF leak.   Pt with continued postural headaches, was feeling well this am, headaches after being up in the afternoon  Will continue flat bedrest today and try to increase head of bed again tomorrow  CT lumbar spine shows no large fluid collection   Pain meds as needed  Will start SQ heparin for DVT PPX   d/w Dr Neff

## 2022-08-13 PROCEDURE — 72148 MRI LUMBAR SPINE W/O DYE: CPT | Mod: 26

## 2022-08-13 RX ADMIN — HEPARIN SODIUM 5000 UNIT(S): 5000 INJECTION INTRAVENOUS; SUBCUTANEOUS at 21:11

## 2022-08-13 RX ADMIN — Medication 5 MILLIGRAM(S): at 21:20

## 2022-08-13 RX ADMIN — Medication 50 MILLIGRAM(S): at 21:12

## 2022-08-13 RX ADMIN — Medication 5 MILLIGRAM(S): at 09:13

## 2022-08-13 RX ADMIN — LISINOPRIL 20 MILLIGRAM(S): 2.5 TABLET ORAL at 09:12

## 2022-08-13 RX ADMIN — HEPARIN SODIUM 5000 UNIT(S): 5000 INJECTION INTRAVENOUS; SUBCUTANEOUS at 14:19

## 2022-08-13 RX ADMIN — HEPARIN SODIUM 5000 UNIT(S): 5000 INJECTION INTRAVENOUS; SUBCUTANEOUS at 06:06

## 2022-08-13 RX ADMIN — OXYCODONE HYDROCHLORIDE 10 MILLIGRAM(S): 5 TABLET ORAL at 18:08

## 2022-08-13 RX ADMIN — Medication 1 TABLET(S): at 09:13

## 2022-08-13 RX ADMIN — SENNA PLUS 2 TABLET(S): 8.6 TABLET ORAL at 21:12

## 2022-08-13 RX ADMIN — Medication 1 MILLIGRAM(S): at 09:12

## 2022-08-13 RX ADMIN — Medication 50 MILLIGRAM(S): at 09:13

## 2022-08-13 RX ADMIN — OXYCODONE HYDROCHLORIDE 10 MILLIGRAM(S): 5 TABLET ORAL at 18:38

## 2022-08-13 NOTE — PROGRESS NOTE ADULT - SUBJECTIVE AND OBJECTIVE BOX
HPI: 56 yo male POD#5 L5/S1 microdiscectomy and repair of CSF leak.     8/10: POD#2 Patient noted to have headache overnight, improved when HOB flat. Patient currently with no headaches    8/11- POD #3, pt seen and examined, no headache currently but every time he sits up more than 20 minutes he gets a headache which resolves when he lays flat.   Incision site is clean and dry, no fluid leaking from incision, no bulging below the skin. Patient is afebrile, tolerating PO, voiding without difficulty.     8/12 POD#4 felt well in am without headache, but c/o of headache again after being up for a while    8/13 POD#5 c/o incisional "tightness/burning pain"    ICU Vital Signs Last 24 Hrs  T(C): 36.7 (13 Aug 2022 09:13), Max: 36.8 (12 Aug 2022 19:48)  T(F): 98 (13 Aug 2022 09:13), Max: 98.2 (12 Aug 2022 19:48)  HR: 65 (13 Aug 2022 09:13) (56 - 74)  BP: 124/84 (13 Aug 2022 09:13) (108/76 - 140/96)  BP(mean): --  ABP: --  ABP(mean): --  RR: 16 (13 Aug 2022 09:13) (16 - 17)  SpO2: 98% (13 Aug 2022 09:13) (98% - 100%)    O2 Parameters below as of 13 Aug 2022 09:13  Patient On (Oxygen Delivery Method): room air    MEDICATIONS  (STANDING):  folic acid 1 milliGRAM(s) Oral daily  folic acid 1 milliGRAM(s) Oral daily  heparin   Injectable 5000 Unit(s) SubCutaneous every 8 hours  lisinopril 20 milliGRAM(s) Oral daily  metoprolol tartrate 50 milliGRAM(s) Oral two times a day  multivitamin 1 Tablet(s) Oral daily  senna 2 Tablet(s) Oral at bedtime    MEDICATIONS  (PRN):  acetaminophen     Tablet .. 650 milliGRAM(s) Oral every 6 hours PRN Temp greater or equal to 38C (100.4F), Mild Pain (1 - 3)  bisacodyl 5 milliGRAM(s) Oral every 12 hours PRN Constipation  diazepam    Tablet 5 milliGRAM(s) Oral every 8 hours PRN muscle spasm  famotidine    Tablet 20 milliGRAM(s) Oral every 12 hours PRN Dyspepsia  HYDROmorphone  Injectable 0.5 milliGRAM(s) IV Push every 4 hours PRN for breakthrough pain  ondansetron Injectable 4 milliGRAM(s) IV Push every 6 hours PRN Nausea and/or Vomiting  oxyCODONE    IR 10 milliGRAM(s) Oral every 6 hours PRN Moderate Pain (4 - 6)    ROS: no chest pain, SOB, palpitations or nausea/vomiting    PHYSICAL EXAM:  Constitutional: awake and alert  HEENT: PERRL, EOMI  Neck: Supple  Respiratory: Breath sounds are clear bilaterally  Cardiovascular: S1 S2, regular rhythm  Gastrointestinal: soft, nontender  Extremities:  no edema  Musculoskeletal: no joint swelling/tenderness, no abnormal movements  Skin: incision approximated with sutures without drainage or signs of infection    Neurological exam:  Awake alert, AOx3 Follows commands  Face symmetrical, speech clear & fluent   Motor: Strength 5/5 in all 4 extremities  Sensation: Intact to light touch   Gait/Balance: Not tested     RADIOLOGY:  < from: CT Lumbar Spine No Cont (08.11.22 @ 09:33) >  IMPRESSION:  Mild to moderate degenerative disc disease and spondylosis   at L1-2 through L5-S1 with bulges at L2-3 through L5-S1 which flatten the   ventral thecal sac and narrow the BILATERAL neural foramina. Small LEFT   foraminal disc herniation noted at L2-3 which narrows the LEFT neural   foramen. Mild central stenosis at L4-5 on a degenerative basis. Moderate   central to LEFT paracentral disc herniation at L5-S1 which compresses the   ventral thecal sac. 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 Tiny foci of air are seen within the spinal canal   due to recent microdiscectomy. Postsurgical changes noted along the LEFT   partial laminectomy surgical tract. No large fluid collection is noted.     No vertebral fracture is recognized.            Assessment and Plan:   · Assessment	  57 year old male POD#5  L5-S1 microdiscectomy and repair of CSF leak.   continue pain medication/muscle relaxants  MRI L spine today   d/w Dr Camejo

## 2022-08-14 RX ADMIN — FAMOTIDINE 20 MILLIGRAM(S): 10 INJECTION INTRAVENOUS at 08:00

## 2022-08-14 RX ADMIN — Medication 1 TABLET(S): at 08:01

## 2022-08-14 RX ADMIN — HEPARIN SODIUM 5000 UNIT(S): 5000 INJECTION INTRAVENOUS; SUBCUTANEOUS at 21:07

## 2022-08-14 RX ADMIN — LISINOPRIL 20 MILLIGRAM(S): 2.5 TABLET ORAL at 08:03

## 2022-08-14 RX ADMIN — Medication 1 MILLIGRAM(S): at 08:01

## 2022-08-14 RX ADMIN — HEPARIN SODIUM 5000 UNIT(S): 5000 INJECTION INTRAVENOUS; SUBCUTANEOUS at 05:53

## 2022-08-14 RX ADMIN — Medication 5 MILLIGRAM(S): at 08:00

## 2022-08-14 RX ADMIN — Medication 50 MILLIGRAM(S): at 21:07

## 2022-08-14 RX ADMIN — Medication 50 MILLIGRAM(S): at 08:01

## 2022-08-14 RX ADMIN — HEPARIN SODIUM 5000 UNIT(S): 5000 INJECTION INTRAVENOUS; SUBCUTANEOUS at 14:14

## 2022-08-14 RX ADMIN — Medication 5 MILLIGRAM(S): at 18:51

## 2022-08-14 RX ADMIN — SENNA PLUS 2 TABLET(S): 8.6 TABLET ORAL at 21:07

## 2022-08-14 NOTE — PROGRESS NOTE ADULT - SUBJECTIVE AND OBJECTIVE BOX
HPI: 56 yo male POD#6  L5/S1 microdiscectomy and repair of CSF leak.     8/10: POD#2 Patient noted to have headache overnight, improved when HOB flat. Patient currently with no headaches    8/11- POD #3, pt seen and examined, no headache currently but every time he sits up more than 20 minutes he gets a headache which resolves when he lays flat.   Incision site is clean and dry, no fluid leaking from incision, no bulging below the skin. Patient is afebrile, tolerating PO, voiding without difficulty.     8/12 POD#4 felt well in am without headache, but c/o of headache again after being up for a while    8/13 POD#5 c/o incisional "tightness/burning pain"    8/14 POD#6 lying in bed, denies headache, when OOB c/o dizziness & headache    ICU Vital Signs Last 24 Hrs  T(C): 36.8 (14 Aug 2022 09:47), Max: 36.8 (14 Aug 2022 09:47)  T(F): 98.2 (14 Aug 2022 09:47), Max: 98.2 (14 Aug 2022 09:47)  HR: 62 (14 Aug 2022 09:47) (62 - 68)  BP: 131/80 (14 Aug 2022 09:47) (99/61 - 131/80)  BP(mean): 80 (13 Aug 2022 20:38) (80 - 80)  ABP: --  ABP(mean): --  RR: 16 (14 Aug 2022 09:47) (16 - 18)  SpO2: 97% (14 Aug 2022 09:47) (97% - 98%)    O2 Parameters below as of 14 Aug 2022 09:47  Patient On (Oxygen Delivery Method): room air    ROS: no chest pain, SOB, palpitations or nausea/vomiting    PHYSICAL EXAM:  Constitutional: awake and alert  HEENT: PERRL, EOMI  Neck: Supple  Respiratory: Breath sounds are clear bilaterally  Cardiovascular: S1 S2, regular rhythm  Gastrointestinal: soft, nontender  Extremities:  no edema  Musculoskeletal: no joint swelling/tenderness, no abnormal movements  Skin: incision approximated with sutures without drainage or signs of infection    Neurological exam:  Awake alert, AOx3 Follows commands  Face symmetrical, speech clear & fluent   Motor: Strength 5/5 in all 4 extremities  Sensation: Intact to light touch   Gait/Balance: Not tested     RADIOLOGY:  < from: CT Lumbar Spine No Cont (08.11.22 @ 09:33) >  IMPRESSION:  Mild to moderate degenerative disc disease and spondylosis   at L1-2 through L5-S1 with bulges at L2-3 through L5-S1 which flatten the   ventral thecal sac and narrow the BILATERAL neural foramina. Small LEFT   foraminal disc herniation noted at L2-3 which narrows the LEFT neural   foramen. Mild central stenosis at L4-5 on a degenerative basis. Moderate   central to LEFT paracentral disc herniation at L5-S1 which compresses the   ventral thecal sac. 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 Tiny foci of air are seen within the spinal canal   due to recent microdiscectomy. Postsurgical changes noted along the LEFT   partial laminectomy surgical tract. No large fluid collection is noted.     No vertebral fracture is recognized.    < from: MR Lumbar Spine No Cont (08.13.22 @ 15:24) >  INTERPRETATION:  MR lumbar without gadolinium    CLINICAL INFORMATION:   Status post L5-S1 laminectomy, CSF leak.    TECHNIQUE:   Sagittal and axial T1-weighted images, sagittal STIR images,   sagittal T2-weighted images and axial T1 and T2-weighted images of the   lumbar spine were obtained.    FINDINGS:   No prior similar studies are available for review.    Lumbar vertebral alignment is preserved.  Lumbar vertebral body heights   are maintained. LEFT L5 laminotomy with fluid seen along the surgical   tract and ventral to the descending LEFT S1 nerve root. Although this is   likely postsurgical the possibility of a CSF leak cannot be excluded.   There is also expansion of the descending LEFT S1 nerve root sheath which   may reflect subdural fluid. Marrow signal intensity within lumbar   vertebral bodies and posterior elements is significant for mild fatty   degenerative endplate changes at L4-5.  No osseous expansion, epidural   disease or paraspinal abnormality is found.    Lumbar intervertebral discs show mild degenerative disc disease at L4-5   and L5-S1 with loss of disc height and signal within the nucleus   pulposus. Disc bulges are noted at these levels which flatten the ventral   thecal sac and narrow the BILATERAL neural foramina. Tiny posterior   annular tears are noted in the LEFT paracentral region at these levels   with tiny associated disc herniations.    The distalcord maintains intact morphology.  Distal cord signal   intensity is preserved.  The conus is normally positioned at the L1   level.  Nerve roots of the cauda equina appear intact.    2.2 cm cyst in the RIGHT lobe of the liver. 1.3 cm cyst in the LEFT   kidney and 1.5 cm cyst in RIGHT kidney.      IMPRESSION:  LEFT L5 laminotomy with fluid seen along the surgical tract   and ventral to the descending LEFT S1 nerve root. Although this is likely   postsurgical the possibility of a CSF leak cannot be excluded. There is   also expansion of the descending LEFT S1 nerve root sheath which may   reflect subdural fluid.   Mild degenerative disc disease at L4-5 and   L5-S1 with loss of disc height and signal within the nucleus pulposus.   Disc bulges are noted at these levels which flatten the ventral thecal   sac and narrow the BILATERAL neural foramina. Tiny posterior annular   tears are noted in the LEFT paracentral region at these levels with tiny   associated disc herniations.

## 2022-08-14 NOTE — PROGRESS NOTE ADULT - ASSESSMENT
Assessment and Plan:   · Assessment	  57 year old male POD#5  L5-S1 microdiscectomy and repair of CSF leak.   continue pain medication/muscle relaxants  MRI L spine completed  Plan to sit in chair today & monitor foe headache  d/w Dr Camejo

## 2022-08-15 RX ADMIN — HEPARIN SODIUM 5000 UNIT(S): 5000 INJECTION INTRAVENOUS; SUBCUTANEOUS at 21:09

## 2022-08-15 RX ADMIN — Medication 1 TABLET(S): at 09:50

## 2022-08-15 RX ADMIN — SENNA PLUS 2 TABLET(S): 8.6 TABLET ORAL at 21:09

## 2022-08-15 RX ADMIN — Medication 50 MILLIGRAM(S): at 21:09

## 2022-08-15 RX ADMIN — Medication 5 MILLIGRAM(S): at 21:09

## 2022-08-15 RX ADMIN — Medication 50 MILLIGRAM(S): at 09:51

## 2022-08-15 RX ADMIN — HEPARIN SODIUM 5000 UNIT(S): 5000 INJECTION INTRAVENOUS; SUBCUTANEOUS at 13:27

## 2022-08-15 RX ADMIN — LISINOPRIL 20 MILLIGRAM(S): 2.5 TABLET ORAL at 09:50

## 2022-08-15 RX ADMIN — HEPARIN SODIUM 5000 UNIT(S): 5000 INJECTION INTRAVENOUS; SUBCUTANEOUS at 05:15

## 2022-08-15 RX ADMIN — Medication 1 MILLIGRAM(S): at 09:50

## 2022-08-15 RX ADMIN — Medication 5 MILLIGRAM(S): at 18:46

## 2022-08-15 NOTE — PROGRESS NOTE ADULT - ASSESSMENT
57 year old male s/p L5-S1 microdiscectomy and repair of CSF leak    POD#7    - Continue pain medication/muscle relaxants  - OOB  - PT  - Increase activity as tolerated  - Incentive spirometer  - SQH for DVT ppx  - Possible d/c home in AM    Discussed Dr Camejo

## 2022-08-15 NOTE — PROGRESS NOTE ADULT - SUBJECTIVE AND OBJECTIVE BOX
Patient is a 57y old  Male who presents with a chief complaint of L5-S1 microdiscectomy (14 Aug 2022 15:12)      HPI:  58 yo male s/p L5/S1 microdiscectomy and repair of CSF leak.     8/10 - POD#2 Patient noted to have headache overnight, improved when HOB flat. Patient currently with no headaches    8/11 - POD #3, pt seen and examined, no headache currently but every time he sits up more than 20 minutes he gets a headache which resolves when he lays flat.   Incision site is clean and dry, no fluid leaking from incision, no bulging below the skin. Patient is afebrile, tolerating PO, voiding without difficulty.     8/12 - POD#4 felt well in am without headache, but c/o of headache again after being up for a while    8/13 - POD#5 c/o incisional "tightness/burning pain"    8/14 - POD#6 lying in bed, denies headache, when OOB c/o dizziness & headache    8/15 - POD#7 Pt seen and examined earlier today, in NAD. Appears comfortable, admits to HA when OOB / ambulating but states it has been getting better. Not sure about going home since he lives alone and is afraid he will not be able to manage        acetaminophen     Tablet .. 650 milliGRAM(s) Oral every 6 hours PRN  bisacodyl 5 milliGRAM(s) Oral every 12 hours PRN  diazepam    Tablet 5 milliGRAM(s) Oral every 8 hours PRN  famotidine    Tablet 20 milliGRAM(s) Oral every 12 hours PRN  folic acid 1 milliGRAM(s) Oral daily  folic acid 1 milliGRAM(s) Oral daily  heparin   Injectable 5000 Unit(s) SubCutaneous every 8 hours  HYDROmorphone  Injectable 0.5 milliGRAM(s) IV Push every 4 hours PRN  lisinopril 20 milliGRAM(s) Oral daily  metoprolol tartrate 50 milliGRAM(s) Oral two times a day  multivitamin 1 Tablet(s) Oral daily  ondansetron Injectable 4 milliGRAM(s) IV Push every 6 hours PRN  oxyCODONE    IR 10 milliGRAM(s) Oral every 6 hours PRN  senna 2 Tablet(s) Oral at bedtime        Vital Signs Last 24 Hrs  T(C): 36.7 (15 Aug 2022 08:46), Max: 36.8 (15 Aug 2022 00:18)  T(F): 98.1 (15 Aug 2022 08:46), Max: 98.2 (15 Aug 2022 00:18)  HR: 80 (15 Aug 2022 08:46) (71 - 86)  BP: 126/94 (15 Aug 2022 08:46) (111/69 - 126/94)  BP(mean): 88 (14 Aug 2022 20:36) (88 - 88)  RR: 18 (15 Aug 2022 08:46) (17 - 18)  SpO2: 96% (15 Aug 2022 08:46) (96% - 97%)        PHYSICAL EXAM:  Constitutional: awake and alert  HEENT: PERRL, EOMI  Neck: Supple  Respiratory: Breath sounds are clear bilaterally  Cardiovascular: S1 S2, regular rhythm  Gastrointestinal: soft, nontender  Extremities:  no edema  Musculoskeletal: no joint swelling/tenderness, no abnormal movements  Skin: incision approximated with sutures without drainage or signs of infection    Neurological exam:  Awake alert, AOx3 Follows commands  Face symmetrical, speech clear & fluent   Motor: Strength 5/5 in all 4 extremities  Sensation: Intact to light touch   Gait/Balance: Not tested

## 2022-08-16 VITALS
DIASTOLIC BLOOD PRESSURE: 76 MMHG | SYSTOLIC BLOOD PRESSURE: 110 MMHG | OXYGEN SATURATION: 100 % | TEMPERATURE: 98 F | HEART RATE: 66 BPM | RESPIRATION RATE: 18 BRPM

## 2022-08-16 LAB
ALBUMIN SERPL ELPH-MCNC: 3 G/DL — LOW (ref 3.3–5)
ALP SERPL-CCNC: 60 U/L — SIGNIFICANT CHANGE UP (ref 40–120)
ALT FLD-CCNC: 80 U/L — HIGH (ref 12–78)
ANION GAP SERPL CALC-SCNC: 6 MMOL/L — SIGNIFICANT CHANGE UP (ref 5–17)
AST SERPL-CCNC: 28 U/L — SIGNIFICANT CHANGE UP (ref 15–37)
BILIRUB SERPL-MCNC: 0.5 MG/DL — SIGNIFICANT CHANGE UP (ref 0.2–1.2)
BUN SERPL-MCNC: 21 MG/DL — SIGNIFICANT CHANGE UP (ref 7–23)
CALCIUM SERPL-MCNC: 8.9 MG/DL — SIGNIFICANT CHANGE UP (ref 8.5–10.1)
CHLORIDE SERPL-SCNC: 108 MMOL/L — SIGNIFICANT CHANGE UP (ref 96–108)
CO2 SERPL-SCNC: 23 MMOL/L — SIGNIFICANT CHANGE UP (ref 22–31)
CREAT SERPL-MCNC: 0.84 MG/DL — SIGNIFICANT CHANGE UP (ref 0.5–1.3)
EGFR: 103 ML/MIN/1.73M2 — SIGNIFICANT CHANGE UP
GLUCOSE SERPL-MCNC: 94 MG/DL — SIGNIFICANT CHANGE UP (ref 70–99)
HCT VFR BLD CALC: 42.4 % — SIGNIFICANT CHANGE UP (ref 39–50)
HGB BLD-MCNC: 15.2 G/DL — SIGNIFICANT CHANGE UP (ref 13–17)
MCHC RBC-ENTMCNC: 34.2 PG — HIGH (ref 27–34)
MCHC RBC-ENTMCNC: 35.8 GM/DL — SIGNIFICANT CHANGE UP (ref 32–36)
MCV RBC AUTO: 95.5 FL — SIGNIFICANT CHANGE UP (ref 80–100)
PLATELET # BLD AUTO: 204 K/UL — SIGNIFICANT CHANGE UP (ref 150–400)
POTASSIUM SERPL-MCNC: 4 MMOL/L — SIGNIFICANT CHANGE UP (ref 3.5–5.3)
POTASSIUM SERPL-SCNC: 4 MMOL/L — SIGNIFICANT CHANGE UP (ref 3.5–5.3)
PROT SERPL-MCNC: 6.8 GM/DL — SIGNIFICANT CHANGE UP (ref 6–8.3)
RBC # BLD: 4.44 M/UL — SIGNIFICANT CHANGE UP (ref 4.2–5.8)
RBC # FLD: 13.1 % — SIGNIFICANT CHANGE UP (ref 10.3–14.5)
SODIUM SERPL-SCNC: 137 MMOL/L — SIGNIFICANT CHANGE UP (ref 135–145)
WBC # BLD: 8.08 K/UL — SIGNIFICANT CHANGE UP (ref 3.8–10.5)
WBC # FLD AUTO: 8.08 K/UL — SIGNIFICANT CHANGE UP (ref 3.8–10.5)

## 2022-08-16 RX ADMIN — LISINOPRIL 20 MILLIGRAM(S): 2.5 TABLET ORAL at 10:27

## 2022-08-16 RX ADMIN — Medication 50 MILLIGRAM(S): at 10:27

## 2022-08-16 RX ADMIN — HEPARIN SODIUM 5000 UNIT(S): 5000 INJECTION INTRAVENOUS; SUBCUTANEOUS at 13:09

## 2022-08-16 RX ADMIN — Medication 1 MILLIGRAM(S): at 10:26

## 2022-08-16 RX ADMIN — HEPARIN SODIUM 5000 UNIT(S): 5000 INJECTION INTRAVENOUS; SUBCUTANEOUS at 05:27

## 2022-08-16 RX ADMIN — Medication 1 TABLET(S): at 10:27

## 2022-08-16 NOTE — PROGRESS NOTE ADULT - SUBJECTIVE AND OBJECTIVE BOX
Patient is a 57y old  Male who presents with a chief complaint of L5-S1 microdiscectomy (14 Aug 2022 15:12)      HPI:  58 yo male s/p L5/S1 microdiscectomy and repair of CSF leak.     8/10 - POD#2 Patient noted to have headache overnight, improved when HOB flat. Patient currently with no headaches    8/11 - POD #3, pt seen and examined, no headache currently but every time he sits up more than 20 minutes he gets a headache which resolves when he lays flat.   Incision site is clean and dry, no fluid leaking from incision, no bulging below the skin. Patient is afebrile, tolerating PO, voiding without difficulty.     8/12 - POD#4 felt well in am without headache, but c/o of headache again after being up for a while    8/13 - POD#5 c/o incisional "tightness/burning pain"    8/14 - POD#6 lying in bed, denies headache, when OOB c/o dizziness & headache    8/15 - POD#7 Pt seen and examined earlier today, in NAD. Appears comfortable, admits to HA when OOB / ambulating but states it has been getting better. Not sure about going home since he lives alone and is afraid he will not be able to manage    8/16- POD#6 Headaches improved today    ICU Vital Signs Last 24 Hrs  T(C): 36.7 (16 Aug 2022 08:23), Max: 37.1 (16 Aug 2022 00:45)  T(F): 98.1 (16 Aug 2022 08:23), Max: 98.8 (16 Aug 2022 00:45)  HR: 66 (16 Aug 2022 08:23) (66 - 87)  BP: 110/76 (16 Aug 2022 08:23) (102/66 - 113/65)  BP(mean): 76 (15 Aug 2022 20:21) (76 - 76)  ABP: --  ABP(mean): --  RR: 18 (16 Aug 2022 08:23) (18 - 18)  SpO2: 100% (16 Aug 2022 08:23) (96% - 100%)    O2 Parameters below as of 16 Aug 2022 08:23  Patient On (Oxygen Delivery Method): room air      MEDICATIONS  (STANDING):  folic acid 1 milliGRAM(s) Oral daily  folic acid 1 milliGRAM(s) Oral daily        PHYSICAL EXAM:  Constitutional: awake and alert  HEENT: PERRL, EOMI  Neck: Supple  Respiratory: Breath sounds are clear bilaterally  Cardiovascular: S1 S2, regular rhythm  Gastrointestinal: soft, nontender  Extremities:  no edema  Musculoskeletal: no joint swelling/tenderness, no abnormal movements  Skin: incision approximated with sutures without drainage or signs of infection    Neurological exam:  Awake alert, AOx3 Follows commands  Face symmetrical, speech clear & fluent   Motor: Strength 5/5 in all 4 extremities  Sensation: Intact to light touch   Gait/Balance: Not tested

## 2022-08-16 NOTE — PROGRESS NOTE ADULT - REASON FOR ADMISSION
POD#6 L5-S1 microdiscectomy
POD#6 L5-S1 microdiscetomy
POD#5 L5-S1 microdiscectomy
L5-S1 SREE
microdiscectomy

## 2022-08-16 NOTE — DISCHARGE NOTE NURSING/CASE MANAGEMENT/SOCIAL WORK - NSDCPEFALRISK_GEN_ALL_CORE
For information on Fall & Injury Prevention, visit: [email or web address] OR  [email or web address] OR  https://www.cdc.gov/steadi/patient.html

## 2022-08-16 NOTE — PROGRESS NOTE ADULT - THIS PATIENT HAS THE FOLLOWING CONDITION(S)/DIAGNOSES ON THIS ADMISSION:
None
None
POD#6 L5-S1 microdiscectomy
None
None
POD#5 L5-S1 microdiscectomy
spine surgery
POD#6 L5-S1 microdiscetomy

## 2022-08-16 NOTE — DISCHARGE NOTE NURSING/CASE MANAGEMENT/SOCIAL WORK - PATIENT PORTAL LINK FT
You can access the FollowMyHealth Patient Portal offered by [facility] by registering at the following website: [email or web address]. By joining [facility]’s FollowMyHealth portal, you will also be able to view your health information using other applications (apps) compatible with our system.

## 2022-08-16 NOTE — PROGRESS NOTE ADULT - PROVIDER SPECIALTY LIST ADULT
Neurosurgery